# Patient Record
Sex: FEMALE | URBAN - NONMETROPOLITAN AREA
[De-identification: names, ages, dates, MRNs, and addresses within clinical notes are randomized per-mention and may not be internally consistent; named-entity substitution may affect disease eponyms.]

---

## 2017-10-13 ENCOUNTER — APPOINTMENT (RX ONLY)
Dept: URBAN - NONMETROPOLITAN AREA CLINIC 1 | Facility: CLINIC | Age: 32
Setting detail: DERMATOLOGY
End: 2017-10-13

## 2017-10-13 DIAGNOSIS — L72.11 PILAR CYST: ICD-10-CM

## 2017-10-13 DIAGNOSIS — D22 MELANOCYTIC NEVI: ICD-10-CM

## 2017-10-13 PROBLEM — D22.72 MELANOCYTIC NEVI OF LEFT LOWER LIMB, INCLUDING HIP: Status: ACTIVE | Noted: 2017-10-13

## 2017-10-13 PROBLEM — D22.5 MELANOCYTIC NEVI OF TRUNK: Status: ACTIVE | Noted: 2017-10-13

## 2017-10-13 PROCEDURE — ? COUNSELING

## 2017-10-13 PROCEDURE — 99242 OFF/OP CONSLTJ NEW/EST SF 20: CPT

## 2017-10-13 PROCEDURE — ? OBSERVATION

## 2017-10-13 ASSESSMENT — LOCATION SIMPLE DESCRIPTION DERM
LOCATION SIMPLE: ABDOMEN
LOCATION SIMPLE: LEFT 5TH TOE
LOCATION SIMPLE: SCALP

## 2017-10-13 ASSESSMENT — LOCATION ZONE DERM
LOCATION ZONE: TOE
LOCATION ZONE: SCALP
LOCATION ZONE: TRUNK

## 2017-10-13 ASSESSMENT — LOCATION DETAILED DESCRIPTION DERM
LOCATION DETAILED: LEFT DORSAL 5TH TOE
LOCATION DETAILED: LEFT CENTRAL FRONTAL SCALP
LOCATION DETAILED: PERIUMBILICAL SKIN

## 2017-10-13 NOTE — PROCEDURE: OBSERVATION
Body Location Override (Optional - Billing Will Still Be Based On Selected Body Map Location If Applicable): L abdomen
X Size Of Lesion In Cm (Optional): 0.4
Size Of Lesion In Cm (Optional): 0.5
Morphology Per Location (Optional): Even brown macule
Detail Level: Simple
Size Of Lesion In Cm (Optional): 0
Morphology Per Location (Optional): two even brown macules: 3x3 mm, 2x2 mm
Body Location Override (Optional - Billing Will Still Be Based On Selected Body Map Location If Applicable): L medial 5th toe
Body Location Override (Optional - Billing Will Still Be Based On Selected Body Map Location If Applicable): L scalp
Morphology Per Location (Optional): two 0.6 x 0.6 cm lesions

## 2020-08-21 ENCOUNTER — APPOINTMENT (RX ONLY)
Dept: URBAN - NONMETROPOLITAN AREA CLINIC 1 | Facility: CLINIC | Age: 35
Setting detail: DERMATOLOGY
End: 2020-08-21

## 2020-08-21 VITALS — TEMPERATURE: 97.3 F

## 2020-08-21 DIAGNOSIS — Z12.83 ENCOUNTER FOR SCREENING FOR MALIGNANT NEOPLASM OF SKIN: ICD-10-CM

## 2020-08-21 DIAGNOSIS — D22 MELANOCYTIC NEVI: ICD-10-CM

## 2020-08-21 DIAGNOSIS — L72.11 PILAR CYST: ICD-10-CM

## 2020-08-21 PROBLEM — D22.71 MELANOCYTIC NEVI OF RIGHT LOWER LIMB, INCLUDING HIP: Status: ACTIVE | Noted: 2020-08-21

## 2020-08-21 PROBLEM — D22.72 MELANOCYTIC NEVI OF LEFT LOWER LIMB, INCLUDING HIP: Status: ACTIVE | Noted: 2020-08-21

## 2020-08-21 PROBLEM — D22.5 MELANOCYTIC NEVI OF TRUNK: Status: ACTIVE | Noted: 2020-08-21

## 2020-08-21 PROCEDURE — 99214 OFFICE O/P EST MOD 30 MIN: CPT

## 2020-08-21 PROCEDURE — ? COUNSELING

## 2020-08-21 PROCEDURE — ? OBSERVATION

## 2020-08-21 ASSESSMENT — LOCATION SIMPLE DESCRIPTION DERM
LOCATION SIMPLE: ABDOMEN
LOCATION SIMPLE: RIGHT POSTERIOR THIGH
LOCATION SIMPLE: RIGHT LOWER BACK
LOCATION SIMPLE: SCALP
LOCATION SIMPLE: LEFT 5TH TOE
LOCATION SIMPLE: RIGHT UPPER BACK

## 2020-08-21 ASSESSMENT — LOCATION DETAILED DESCRIPTION DERM
LOCATION DETAILED: LEFT CENTRAL FRONTAL SCALP
LOCATION DETAILED: RIGHT DISTAL POSTERIOR THIGH
LOCATION DETAILED: RIGHT SUPERIOR MEDIAL UPPER BACK
LOCATION DETAILED: LEFT DORSAL 5TH TOE
LOCATION DETAILED: RIGHT SUPERIOR MEDIAL MIDBACK
LOCATION DETAILED: PERIUMBILICAL SKIN

## 2020-08-21 ASSESSMENT — LOCATION ZONE DERM
LOCATION ZONE: SCALP
LOCATION ZONE: LEG
LOCATION ZONE: TOE
LOCATION ZONE: TRUNK

## 2020-08-21 NOTE — PROCEDURE: OBSERVATION
Body Location Override (Optional - Billing Will Still Be Based On Selected Body Map Location If Applicable): L abdomen
X Size Of Lesion In Cm (Optional): 0.4
Size Of Lesion In Cm (Optional): 0.5
Morphology Per Location (Optional): Even brown macule
Detail Level: Simple
Size Of Lesion In Cm (Optional): 0
Morphology Per Location (Optional): two even brown macules: 3x3 mm, 2x1 mm
Body Location Override (Optional - Billing Will Still Be Based On Selected Body Map Location If Applicable): L medial 5th toe
Body Location Override (Optional - Billing Will Still Be Based On Selected Body Map Location If Applicable): L scalp
Morphology Per Location (Optional): two 0.6 x 0.6 cm lesions
Detail Level: Detailed
Size Of Lesion In Cm (Optional): 0.3
X Size Of Lesion In Cm (Optional): 0.1
Morphology Per Location (Optional): Irregular brown macule
Morphology Per Location (Optional): Even,dark brown macule